# Patient Record
Sex: MALE | Race: WHITE | URBAN - METROPOLITAN AREA
[De-identification: names, ages, dates, MRNs, and addresses within clinical notes are randomized per-mention and may not be internally consistent; named-entity substitution may affect disease eponyms.]

---

## 2017-11-13 ENCOUNTER — OFFICE VISIT (OUTPATIENT)
Dept: FAMILY MEDICINE | Facility: CLINIC | Age: 52
End: 2017-11-13

## 2017-11-13 VITALS
TEMPERATURE: 98.2 F | RESPIRATION RATE: 20 BRPM | WEIGHT: 230.2 LBS | SYSTOLIC BLOOD PRESSURE: 148 MMHG | HEIGHT: 72 IN | HEART RATE: 60 BPM | BODY MASS INDEX: 31.18 KG/M2 | DIASTOLIC BLOOD PRESSURE: 90 MMHG

## 2017-11-13 DIAGNOSIS — R51.9 PAIN OF CHEEK: Primary | ICD-10-CM

## 2017-11-13 PROCEDURE — 99202 OFFICE O/P NEW SF 15 MIN: CPT | Performed by: PHYSICIAN ASSISTANT

## 2017-11-13 NOTE — PATIENT INSTRUCTIONS
Most consistent with a salivary gland block  -continue taking ibuprofen with food 2 tablets every 6 hours (at least 3 times per day)  - suck on hard candies.   If symptoms worsen, fever, sweats, chills, worsening pain, rash, call me ([phone]) to consider antibiotics or other treatments.   If persisting when back home, see dentist.

## 2017-11-13 NOTE — NURSING NOTE
Sergey Rico is here for pain on the right side of his jaw. States that it doesn't feel like tooth pain.   He is on vacation visiting friends from [place]    Questioned patient about current smoking habits.  Pt. has never smoked.  PULSE regular  My Chart: na  CLASSIFICATION OF OVERWEIGHT AND OBESITY BY BMI                        Obesity Class           BMI(kg/m2)  Underweight                                    < 18.5  Normal                                         18.5-24.9  Overweight                                     25.0-29.9  OBESITY                     I                  30.0-34.9                             II                 35.0-39.9  EXTREME OBESITY             III                >40                            Patient's  BMI Body mass index is 31.44 kg/(m^2).

## 2017-11-13 NOTE — MR AVS SNAPSHOT
"              After Visit Summary   11/13/2017    Sergey Rico    MRN: [id number]           Patient Information     Date Of Birth          1965        Visit Information        Provider Department      11/13/2017 2:00 PM Roxanna Aguilar PA-C [facility]        Care Instructions    Most consistent with a salivary gland block  -continue taking ibuprofen with food 2 tablets every 6 hours (at least 3 times per day)  - suck on hard candies.   If symptoms worsen, fever, sweats, chills, worsening pain, rash, call me ([phone]) to consider antibiotics or other treatments.   If persisting when back home, see dentist.          Follow-ups after your visit        Who to contact     If you have questions or need follow up information about today's clinic visit or your schedule please contact [facility] directly at [phone].  Normal or non-critical lab and imaging results will be communicated to you by [facility], letter or phone within 4 business days after the clinic has received the results. If you do not hear from us within 7 days, please contact the clinic through [facility] or phone. If you have a critical or abnormal lab result, we will notify you by phone as soon as possible.  Submit refill requests through [facility] or call your pharmacy and they will forward the refill request to us. Please allow 3 business days for your refill to be completed.          Additional Information About Your Visit        MyChart Information     [facility] lets you send messages to your doctor, view your test results, renew your prescriptions, schedule appointments and more. To sign up, go to [email or web address] . Click on \"Log in\" on the left side of the screen, which will take you to the Welcome page. Then click on \"Sign up Now\" on the right side of the page.     You will be asked to enter the access code listed below, as well as some personal information. Please follow the " "directions to create your username and password.     Your access code is: [email or web address]  Expires: 2018  2:51 PM     Your access code will  in 90 days. If you need help or a new code, please call your [place] clinic or [phone].        Care EveryWhere ID     This is your Care EveryWhere ID. This could be used by other organizations to access your [place] medical records  [id number]        Your Vitals Were     Pulse Temperature Respirations Height BMI (Body Mass Index)       60 98.2  F (36.8  C) (Oral) 20 1.822 m (5' 11.75\") 31.44 kg/m2        Blood Pressure from Last 3 Encounters:   17 148/90    Weight from Last 3 Encounters:   17 104.4 kg (230 lb 3.2 oz)              Today, you had the following     No orders found for display       Primary Care Provider Office Phone # Fax #    Roxanna Karen Aguilar PA-C [phone]       [facility] [address]  [facility] [address]        Equal Access to Services     [facility]: Hadii aad ku hadasho Soomaali, waaxda luqadaha, qaybta kaalmada adeegyada, waxay idiin hayaan adesarabjit solorzanoarakassie laevelynn . So [facility] [phone].    ATENCIÓN: Si habla español, tiene a taylor disposición servicios gratuitos de asistencia lingüística. Llame al [phone].    We comply with applicable federal civil rights laws and [place] laws. We do not discriminate on the basis of race, color, national origin, age, disability, sex, sexual orientation, or gender identity.            Thank you!     Thank you for choosing [facility]  for your care. Our goal is always to provide you with excellent care. Hearing back from our patients is one way we can continue to improve our services. Please take a few minutes to complete the written survey that you may receive in the mail after your visit with us. Thank you!             Your Updated Medication List - Protect others around you: Learn how to safely use, store and " throw away your medicines at www.disposemymeds.org.      Notice  As of 11/13/2017  2:54 PM    You have not been prescribed any medications.

## 2017-11-13 NOTE — PROGRESS NOTES
"CC: Jaw pain    History:  Arrived to [place] 11/11 from [place]. Just before leaving, started to feel right sided jaw pain. Pain is constantly there. Pain is 5/10 with 10 being the worst. Has been taking ibuprofen as needed, which completely resolves pain. No pain with chewing. No fever, sweats, chills, vision changes. No history of jaw pain/TMJ syndrome.    PMH, MEDICATIONS, ALLERGIES, SOCIAL AND FAMILY HISTORY in [facility] and reviewed by me personally.      ROS negative other than the symptoms noted above in the HPI.        Examination   /90 (BP Location: Left arm, Patient Position: Chair, Cuff Size: Adult Large)  Pulse 60  Temp 98.2  F (36.8  C) (Oral)  Resp 20  Ht 1.822 m (5' 11.75\")  Wt 104.4 kg (230 lb 3.2 oz)  BMI 31.44 kg/m2       Constitutional: Sitting comfortably, in no acute distress. Vital signs noted. BP mildly elevated.  Face: Mild tenderness to palpation in lower right cheek. No palpable swelling external or on inside of mouth by lower gums.   Eyes: pupils equal round reactive to light and accomodation, extra ocular movements intact  Cardiovascular:  regular rate and rhythm, no murmurs, clicks, or gallops  Respiratory:  normal respiratory rate and rhythm, lungs clear to auscultation  NEURO:  cranial nerves 2-12 intact  SKIN: No jaundice/pallor/rash.   Psychiatric: mentation appears normal and affect normal/bright        A/P    ICD-10-CM    1. 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 However, his symptoms seem most consistent with a salivary gland block. Recommended:  -continue taking ibuprofen with food 2 tablets every 6 hours (at least 3 times per day)  - suck on hard candies.   If symptoms worsen, fever, sweats, chills, worsening pain, rash, call me ([phone]) to consider antibiotics or other treatments.   If persisting when back home, see dentist to r/o tooth " infection.    follow up visit: As needed    Roxanna Aguilar PA-C  [facility]